# Patient Record
Sex: FEMALE | Race: WHITE | URBAN - METROPOLITAN AREA
[De-identification: names, ages, dates, MRNs, and addresses within clinical notes are randomized per-mention and may not be internally consistent; named-entity substitution may affect disease eponyms.]

---

## 2022-01-28 ENCOUNTER — WEB ENCOUNTER (OUTPATIENT)
Dept: URBAN - METROPOLITAN AREA CLINIC 82 | Facility: CLINIC | Age: 67
End: 2022-01-28

## 2022-02-02 ENCOUNTER — LAB OUTSIDE AN ENCOUNTER (OUTPATIENT)
Dept: URBAN - METROPOLITAN AREA CLINIC 115 | Facility: CLINIC | Age: 67
End: 2022-02-02

## 2022-02-02 ENCOUNTER — WEB ENCOUNTER (OUTPATIENT)
Dept: URBAN - METROPOLITAN AREA CLINIC 115 | Facility: CLINIC | Age: 67
End: 2022-02-02

## 2022-02-02 ENCOUNTER — OFFICE VISIT (OUTPATIENT)
Dept: URBAN - METROPOLITAN AREA CLINIC 115 | Facility: CLINIC | Age: 67
End: 2022-02-02
Payer: MEDICARE

## 2022-02-02 DIAGNOSIS — Z12.11 COLON CANCER SCREENING: ICD-10-CM

## 2022-02-02 DIAGNOSIS — Z83.79 FAMILY HISTORY OF CELIAC SPRUE: ICD-10-CM

## 2022-02-02 DIAGNOSIS — R74.8 ABNORMAL LIVER ENZYMES: ICD-10-CM

## 2022-02-02 DIAGNOSIS — R94.5 ABNORMAL LFTS: ICD-10-CM

## 2022-02-02 PROBLEM — 430329007: Status: ACTIVE | Noted: 2022-02-02

## 2022-02-02 PROBLEM — 312824007 FAMILY HISTORY OF CANCER OF COLON (SITUATION): Status: ACTIVE | Noted: 2022-02-02

## 2022-02-02 PROCEDURE — 99204 OFFICE O/P NEW MOD 45 MIN: CPT | Performed by: INTERNAL MEDICINE

## 2022-02-02 PROCEDURE — 82977 ASSAY OF GGT: CPT | Performed by: INTERNAL MEDICINE

## 2022-02-02 PROCEDURE — 80074 ACUTE HEPATITIS PANEL: CPT | Performed by: INTERNAL MEDICINE

## 2022-02-02 RX ORDER — PNV NO.95/FERROUS FUM/FOLIC AC 28MG-0.8MG
AS DIRECTED TABLET ORAL
Status: ACTIVE | COMMUNITY

## 2022-02-02 RX ORDER — CARVEDILOL 12.5 MG/1
1 TABLET WITH FOOD TABLET, FILM COATED ORAL TWICE A DAY
Status: ACTIVE | COMMUNITY

## 2022-02-02 RX ORDER — HYDROCORTISONE ACETATE 0.5 %
AS DIRECTED CREAM (GRAM) TOPICAL
Status: ACTIVE | COMMUNITY

## 2022-02-02 RX ORDER — POLYETHYLENE GLYCOL 3350 17 G/17G
AS DIRECTED PRIOR TO COLONOSCOPY POWDER, FOR SOLUTION ORAL ONCE
Qty: 238  GRAM | Refills: 0 | OUTPATIENT
Start: 2022-02-02 | End: 2022-02-03

## 2022-02-02 RX ORDER — ALUMINUM ZIRCONIUM OCTACHLOROHYDREX GLY 16 G/100G
AS DIRECTED GEL TOPICAL
Status: ACTIVE | COMMUNITY

## 2022-02-02 RX ORDER — OMEGA-3 FATTY ACIDS/FISH OIL 300-1000MG
1 CAPSULE CAPSULE ORAL ONCE A DAY
Status: ACTIVE | COMMUNITY

## 2022-02-02 RX ORDER — LISINOPRIL 10 MG/1
1 TABLET TABLET ORAL ONCE A DAY
Status: ACTIVE | COMMUNITY

## 2022-02-02 NOTE — HPI-TODAY'S VISIT:
Ms. Todd is a 66-year-old female came into the office for evaluation of abnormal liver enzymes.  Patient stated she has had labs that is being checked for several years because she is on multiple medications including statins.  Over the years her started noticed to having a progressive worsening of liver enzymes the most recent LFTs showing AST 66 and ALT of 130.  In March 2020 when AST was 44 ALT was 66.  Patient reports having Covid in December 2020.  Denies any family history of liver disease.  She drinks about 2 to 3 glasses of wine every day since the pandemic started.  She had been drinking more heavily in the past 2 years compared to the past.  Her's grandson has celiac disease.  She denies any abdominal pain last colonoscopy was more than 14 years ago.  Denies any other illicit drug abuse she does not recall having any liver disease she denies any pain.  Any new abdominal distention.

## 2022-02-03 ENCOUNTER — OFFICE VISIT (OUTPATIENT)
Dept: URBAN - METROPOLITAN AREA CLINIC 114 | Facility: CLINIC | Age: 67
End: 2022-02-03
Payer: MEDICARE

## 2022-02-03 DIAGNOSIS — R74.8 ELEVATED LIVER ENZYMES: ICD-10-CM

## 2022-02-03 PROCEDURE — 76705 ECHO EXAM OF ABDOMEN: CPT | Performed by: INTERNAL MEDICINE

## 2022-02-03 RX ORDER — HYDROCORTISONE ACETATE 0.5 %
AS DIRECTED CREAM (GRAM) TOPICAL
Status: ACTIVE | COMMUNITY

## 2022-02-03 RX ORDER — LISINOPRIL 10 MG/1
1 TABLET TABLET ORAL ONCE A DAY
Status: ACTIVE | COMMUNITY

## 2022-02-03 RX ORDER — PNV NO.95/FERROUS FUM/FOLIC AC 28MG-0.8MG
AS DIRECTED TABLET ORAL
Status: ACTIVE | COMMUNITY

## 2022-02-03 RX ORDER — OMEGA-3 FATTY ACIDS/FISH OIL 300-1000MG
1 CAPSULE CAPSULE ORAL ONCE A DAY
Status: ACTIVE | COMMUNITY

## 2022-02-03 RX ORDER — CARVEDILOL 12.5 MG/1
1 TABLET WITH FOOD TABLET, FILM COATED ORAL TWICE A DAY
Status: ACTIVE | COMMUNITY

## 2022-02-03 RX ORDER — ALUMINUM ZIRCONIUM OCTACHLOROHYDREX GLY 16 G/100G
AS DIRECTED GEL TOPICAL
Status: ACTIVE | COMMUNITY

## 2022-02-03 RX ORDER — POLYETHYLENE GLYCOL 3350 17 G/17G
AS DIRECTED PRIOR TO COLONOSCOPY POWDER, FOR SOLUTION ORAL ONCE
Qty: 238  GRAM | Refills: 0 | Status: ACTIVE | COMMUNITY
Start: 2022-02-02 | End: 2022-02-03

## 2022-02-05 LAB
A/G RATIO: 1.4
ACTIN (SMOOTH MUSCLE) ANTIBODY: 12
ALBUMIN: 4.5
ALKALINE PHOSPHATASE: 75
ALPHA 2-MACROGLOBULINS, QN: 199
ALT (SGPT) P5P: 140
ALT (SGPT): 130
ANA DIRECT: POSITIVE
ANTI-DNA (DS) AB QN: 10
APOLIPOPROTEIN A-1: 145
AST (SGOT) P5P: 85
AST (SGOT): 78
BILIRUBIN, TOTAL: 0.6
BILIRUBIN, TOTAL: 0.8
BUN/CREATININE RATIO: 16
BUN: 12
CALCIUM: 10.7
CARBON DIOXIDE, TOTAL: 26
CHLORIDE: 104
CHOLESTEROL, TOTAL: 278
COMMENT:: (no result)
CREATININE: 0.76
DEAMIDATED GLIADIN ABS, IGA: 8
DEAMIDATED GLIADIN ABS, IGG: 1
EGFR IF AFRICN AM: 94
EGFR IF NONAFRICN AM: 81
ENDOMYSIAL ANTIBODY IGA: NEGATIVE
FERRITIN, SERUM: 379
FIBROSIS SCORE: 0.3
FIBROSIS SCORING:: (no result)
FIBROSIS STAGE: (no result)
GGT: 47
GGT: 51
GLOBULIN, TOTAL: 3.2
GLUCOSE, SERUM: 99
GLUCOSE: 104
HAPTOGLOBIN: 160
HBSAG SCREEN: NEGATIVE
HCV AB: 0.2
HEIGHT:: 64
HEP A AB, IGM: NEGATIVE
HEP A AB, TOTAL: NEGATIVE
HEP B CORE AB, IGM: NEGATIVE
HEP B SURFACE AB, QUAL: NON REACTIVE
IMMUNOGLOBULIN A, QN, SERUM: 695
INTERPRETATION:: (no result)
INTERPRETATIONS:: (no result)
LIMITATIONS:: (no result)
Lab: (no result)
MITOCHONDRIAL (M2) ANTIBODY: <20
NASH GRADE: (no result)
NASH SCORE: 0.5
NASH SCORING: (no result)
POTASSIUM: 5.1
PROTEIN, TOTAL: 7.7
RNP ANTIBODIES: 2
SJOGREN'S ANTI-SS-A: <0.2
SJOGREN'S ANTI-SS-B: <0.2
SMITH ANTIBODIES: <0.2
SODIUM: 141
STEATOSIS GRADE: (no result)
STEATOSIS GRADING: (no result)
STEATOSIS SCORE: 0.83
T-TRANSGLUTAMINASE (TTG) IGA: <2
T-TRANSGLUTAMINASE (TTG) IGG: 2
TRIGLYCERIDES: 167
WEIGHT:: 171

## 2022-02-07 ENCOUNTER — TELEPHONE ENCOUNTER (OUTPATIENT)
Dept: URBAN - METROPOLITAN AREA CLINIC 92 | Facility: CLINIC | Age: 67
End: 2022-02-07

## 2022-02-10 ENCOUNTER — TELEPHONE ENCOUNTER (OUTPATIENT)
Dept: URBAN - METROPOLITAN AREA CLINIC 92 | Facility: CLINIC | Age: 67
End: 2022-02-10

## 2022-02-17 PROBLEM — 305058001: Status: ACTIVE | Noted: 2022-02-02

## 2022-02-24 ENCOUNTER — OFFICE VISIT (OUTPATIENT)
Dept: URBAN - METROPOLITAN AREA TELEHEALTH 2 | Facility: TELEHEALTH | Age: 67
End: 2022-02-24
Payer: MEDICARE

## 2022-02-24 ENCOUNTER — DASHBOARD ENCOUNTERS (OUTPATIENT)
Age: 67
End: 2022-02-24

## 2022-02-24 DIAGNOSIS — R74.8 ABNORMAL LIVER ENZYMES: ICD-10-CM

## 2022-02-24 DIAGNOSIS — K75.81 NASH (NONALCOHOLIC STEATOHEPATITIS): ICD-10-CM

## 2022-02-24 DIAGNOSIS — R94.5 ABNORMAL LFTS: ICD-10-CM

## 2022-02-24 PROBLEM — 166603001 LIVER FUNCTION TESTS ABNORMAL: Status: ACTIVE | Noted: 2022-02-02

## 2022-02-24 PROBLEM — 166643006 LIVER ENZYMES ABNORMAL: Status: ACTIVE | Noted: 2022-02-24

## 2022-02-24 PROCEDURE — 99213 OFFICE O/P EST LOW 20 MIN: CPT | Performed by: INTERNAL MEDICINE

## 2022-02-24 RX ORDER — PNV NO.95/FERROUS FUM/FOLIC AC 28MG-0.8MG
AS DIRECTED TABLET ORAL
Status: ACTIVE | COMMUNITY

## 2022-02-24 RX ORDER — ALUMINUM ZIRCONIUM OCTACHLOROHYDREX GLY 16 G/100G
AS DIRECTED GEL TOPICAL
Status: ACTIVE | COMMUNITY

## 2022-02-24 RX ORDER — CARVEDILOL 12.5 MG/1
1 TABLET WITH FOOD TABLET, FILM COATED ORAL TWICE A DAY
Status: ACTIVE | COMMUNITY

## 2022-02-24 RX ORDER — HYDROCORTISONE ACETATE 0.5 %
AS DIRECTED CREAM (GRAM) TOPICAL
Status: ACTIVE | COMMUNITY

## 2022-02-24 RX ORDER — LISINOPRIL 10 MG/1
1 TABLET TABLET ORAL ONCE A DAY
Status: ACTIVE | COMMUNITY

## 2022-02-24 RX ORDER — OMEGA-3 FATTY ACIDS/FISH OIL 300-1000MG
1 CAPSULE CAPSULE ORAL ONCE A DAY
Status: ACTIVE | COMMUNITY

## 2022-02-24 NOTE — HPI-TODAY'S VISIT:
Ms. Todd is a 66-year-old female came into the office for evaluation of abnormal liver enzymes.  Patient stated she has had labs that is being checked for several years because she is on multiple medications including statins.  Over the years her started noticed to having a progressive worsening of liver enzymes the most recent LFTs showing AST 66 and ALT of 130.  In March 2020 when AST was 44 ALT was 66.  Patient reports having Covid in December 2020.  Denies any family history of liver disease.  She drinks about 2 to 3 glasses of wine every day since the pandemic started.  She had been drinking more heavily in the past 2 years compared to the past.  Her's grandson has celiac disease.  She denies any abdominal pain last colonoscopy was more than 14 years ago.  Denies any other illicit drug abuse she does not recall having any liver disease she denies any pain.  Any new abdominal distention.  2/24/22: Ms. Todd was seen today for the follow-up to go over her results as well as for new diagnosis of [place].  Since she was last seen patient stated she went back on statins because of her high LDL level.  Work-up for the abnormal liver enzymes are all suggestive of fatty liver.  She also reports cutting down on drinking alcohol.  She is currently drinking 1 glass of wine daily.  Patient is not immune to hep A. Several markers for the autoimmune markers are slightly positive however they are nothing significant.  Patient was also noted to have elevated ferritin more than distal range of fatty liver.

## 2022-03-11 ENCOUNTER — OFFICE VISIT (OUTPATIENT)
Dept: URBAN - METROPOLITAN AREA SURGERY CENTER 13 | Facility: SURGERY CENTER | Age: 67
End: 2022-03-11
Payer: MEDICARE

## 2022-03-11 PROCEDURE — G8907 PT DOC NO EVENTS ON DISCHARG: HCPCS | Performed by: INTERNAL MEDICINE

## 2022-03-11 PROCEDURE — G0121 COLON CA SCRN NOT HI RSK IND: HCPCS | Performed by: INTERNAL MEDICINE

## 2022-03-25 LAB
ALBUMIN: 4.5
ALKALINE PHOSPHATASE: 66
ALT (SGPT): 44
AST (SGOT): 29
BILIRUBIN, DIRECT: 0.27
BILIRUBIN, TOTAL: 1.3
PROTEIN, TOTAL: 7.4

## 2022-03-30 ENCOUNTER — WEB ENCOUNTER (OUTPATIENT)
Dept: URBAN - METROPOLITAN AREA CLINIC 115 | Facility: CLINIC | Age: 67
End: 2022-03-30

## 2022-03-31 ENCOUNTER — LAB OUTSIDE AN ENCOUNTER (OUTPATIENT)
Dept: URBAN - METROPOLITAN AREA TELEHEALTH 2 | Facility: TELEHEALTH | Age: 67
End: 2022-03-31

## 2022-03-31 PROBLEM — 442685003: Status: ACTIVE | Noted: 2022-02-24

## 2022-04-07 ENCOUNTER — LAB OUTSIDE AN ENCOUNTER (OUTPATIENT)
Dept: URBAN - METROPOLITAN AREA TELEHEALTH 2 | Facility: TELEHEALTH | Age: 67
End: 2022-04-07

## 2022-05-19 ENCOUNTER — LAB OUTSIDE AN ENCOUNTER (OUTPATIENT)
Dept: URBAN - METROPOLITAN AREA TELEHEALTH 2 | Facility: TELEHEALTH | Age: 67
End: 2022-05-19